# Patient Record
Sex: MALE | Race: WHITE | NOT HISPANIC OR LATINO | URBAN - METROPOLITAN AREA
[De-identification: names, ages, dates, MRNs, and addresses within clinical notes are randomized per-mention and may not be internally consistent; named-entity substitution may affect disease eponyms.]

---

## 2017-03-30 ENCOUNTER — COMMUNICATION - HEALTHEAST (OUTPATIENT)
Dept: INTERNAL MEDICINE | Facility: CLINIC | Age: 57
End: 2017-03-30

## 2017-04-20 ENCOUNTER — COMMUNICATION - HEALTHEAST (OUTPATIENT)
Dept: INTERNAL MEDICINE | Facility: CLINIC | Age: 57
End: 2017-04-20

## 2017-04-20 DIAGNOSIS — I42.8 OTHER PRIMARY CARDIOMYOPATHIES: ICD-10-CM

## 2017-06-06 ENCOUNTER — COMMUNICATION - HEALTHEAST (OUTPATIENT)
Dept: INTERNAL MEDICINE | Facility: CLINIC | Age: 57
End: 2017-06-06

## 2017-06-06 DIAGNOSIS — E78.5 HYPERLIPIDEMIA: ICD-10-CM

## 2017-08-08 ENCOUNTER — COMMUNICATION - HEALTHEAST (OUTPATIENT)
Dept: SCHEDULING | Facility: CLINIC | Age: 57
End: 2017-08-08

## 2017-08-09 ENCOUNTER — OFFICE VISIT - HEALTHEAST (OUTPATIENT)
Dept: INTERNAL MEDICINE | Facility: CLINIC | Age: 57
End: 2017-08-09

## 2017-08-09 ENCOUNTER — AMBULATORY - HEALTHEAST (OUTPATIENT)
Dept: INTERNAL MEDICINE | Facility: CLINIC | Age: 57
End: 2017-08-09

## 2017-08-09 DIAGNOSIS — I10 BENIGN ESSENTIAL HTN: ICD-10-CM

## 2017-08-09 DIAGNOSIS — R00.0 TACHYCARDIA: ICD-10-CM

## 2017-08-09 DIAGNOSIS — F41.1 GAD (GENERALIZED ANXIETY DISORDER): ICD-10-CM

## 2017-08-09 ASSESSMENT — MIFFLIN-ST. JEOR: SCORE: 2095.03

## 2017-08-18 ENCOUNTER — OFFICE VISIT - HEALTHEAST (OUTPATIENT)
Dept: INTERNAL MEDICINE | Facility: CLINIC | Age: 57
End: 2017-08-18

## 2017-08-18 DIAGNOSIS — I10 ESSENTIAL HYPERTENSION WITH GOAL BLOOD PRESSURE LESS THAN 140/90: ICD-10-CM

## 2017-08-18 ASSESSMENT — MIFFLIN-ST. JEOR: SCORE: 2081.43

## 2017-10-27 ENCOUNTER — COMMUNICATION - HEALTHEAST (OUTPATIENT)
Dept: INTERNAL MEDICINE | Facility: CLINIC | Age: 57
End: 2017-10-27

## 2017-10-27 DIAGNOSIS — F41.1 GAD (GENERALIZED ANXIETY DISORDER): ICD-10-CM

## 2017-11-03 ENCOUNTER — OFFICE VISIT - HEALTHEAST (OUTPATIENT)
Dept: INTERNAL MEDICINE | Facility: CLINIC | Age: 57
End: 2017-11-03

## 2017-11-03 DIAGNOSIS — I10 ESSENTIAL HYPERTENSION WITH GOAL BLOOD PRESSURE LESS THAN 140/90: ICD-10-CM

## 2017-11-03 DIAGNOSIS — F41.1 GAD (GENERALIZED ANXIETY DISORDER): ICD-10-CM

## 2017-11-03 ASSESSMENT — MIFFLIN-ST. JEOR: SCORE: 2090.5

## 2018-01-15 ENCOUNTER — COMMUNICATION - HEALTHEAST (OUTPATIENT)
Dept: INTERNAL MEDICINE | Facility: CLINIC | Age: 58
End: 2018-01-15

## 2018-04-08 ENCOUNTER — COMMUNICATION - HEALTHEAST (OUTPATIENT)
Dept: INTERNAL MEDICINE | Facility: CLINIC | Age: 58
End: 2018-04-08

## 2018-04-08 DIAGNOSIS — E78.5 HYPERLIPIDEMIA: ICD-10-CM

## 2018-07-26 ENCOUNTER — COMMUNICATION - HEALTHEAST (OUTPATIENT)
Dept: INTERNAL MEDICINE | Facility: CLINIC | Age: 58
End: 2018-07-26

## 2018-07-26 DIAGNOSIS — I10 ESSENTIAL HYPERTENSION WITH GOAL BLOOD PRESSURE LESS THAN 140/90: ICD-10-CM

## 2018-09-18 ENCOUNTER — COMMUNICATION - HEALTHEAST (OUTPATIENT)
Dept: INTERNAL MEDICINE | Facility: CLINIC | Age: 58
End: 2018-09-18

## 2018-09-18 DIAGNOSIS — I10 ESSENTIAL HYPERTENSION WITH GOAL BLOOD PRESSURE LESS THAN 140/90: ICD-10-CM

## 2018-10-07 ENCOUNTER — COMMUNICATION - HEALTHEAST (OUTPATIENT)
Dept: INTERNAL MEDICINE | Facility: CLINIC | Age: 58
End: 2018-10-07

## 2018-10-07 DIAGNOSIS — E78.5 HYPERLIPIDEMIA: ICD-10-CM

## 2018-10-14 ENCOUNTER — COMMUNICATION - HEALTHEAST (OUTPATIENT)
Dept: INTERNAL MEDICINE | Facility: CLINIC | Age: 58
End: 2018-10-14

## 2018-10-16 ENCOUNTER — COMMUNICATION - HEALTHEAST (OUTPATIENT)
Dept: INTERNAL MEDICINE | Facility: CLINIC | Age: 58
End: 2018-10-16

## 2018-10-16 DIAGNOSIS — F41.1 GAD (GENERALIZED ANXIETY DISORDER): ICD-10-CM

## 2018-11-19 ENCOUNTER — COMMUNICATION - HEALTHEAST (OUTPATIENT)
Dept: INTERNAL MEDICINE | Facility: CLINIC | Age: 58
End: 2018-11-19

## 2018-11-19 DIAGNOSIS — I10 ESSENTIAL HYPERTENSION WITH GOAL BLOOD PRESSURE LESS THAN 140/90: ICD-10-CM

## 2018-12-17 ENCOUNTER — COMMUNICATION - HEALTHEAST (OUTPATIENT)
Dept: INTERNAL MEDICINE | Facility: CLINIC | Age: 58
End: 2018-12-17

## 2018-12-17 DIAGNOSIS — E78.5 HYPERLIPIDEMIA: ICD-10-CM

## 2019-01-01 ENCOUNTER — ANESTHESIA - HEALTHEAST (OUTPATIENT)
Dept: CT IMAGING | Facility: CLINIC | Age: 59
End: 2019-01-01

## 2019-01-01 ENCOUNTER — COMMUNICATION - HEALTHEAST (OUTPATIENT)
Dept: INTERNAL MEDICINE | Facility: CLINIC | Age: 59
End: 2019-01-01

## 2019-01-01 DIAGNOSIS — I10 ESSENTIAL HYPERTENSION WITH GOAL BLOOD PRESSURE LESS THAN 140/90: ICD-10-CM

## 2019-01-01 DIAGNOSIS — F41.1 GAD (GENERALIZED ANXIETY DISORDER): ICD-10-CM

## 2019-01-01 DIAGNOSIS — E78.5 HYPERLIPIDEMIA: ICD-10-CM

## 2019-01-01 RX ORDER — FUROSEMIDE 40 MG
TABLET ORAL
Qty: 90 TABLET | Refills: 3 | Status: SHIPPED | OUTPATIENT
Start: 2019-01-01

## 2019-01-01 RX ORDER — METOPROLOL TARTRATE 50 MG
TABLET ORAL
Qty: 180 TABLET | Refills: 3 | Status: SHIPPED | OUTPATIENT
Start: 2019-01-01

## 2019-01-01 RX ORDER — ROSUVASTATIN CALCIUM 40 MG/1
TABLET, COATED ORAL
Qty: 90 TABLET | Refills: 3 | Status: SHIPPED | OUTPATIENT
Start: 2019-01-01

## 2019-01-01 RX ORDER — LISINOPRIL 40 MG/1
TABLET ORAL
Qty: 90 TABLET | Refills: 4 | Status: SHIPPED | OUTPATIENT
Start: 2019-01-01

## 2019-01-01 RX ORDER — CITALOPRAM HYDROBROMIDE 20 MG/1
TABLET ORAL
Qty: 30 TABLET | Refills: 10 | Status: SHIPPED | OUTPATIENT
Start: 2019-01-01

## 2021-05-27 NOTE — TELEPHONE ENCOUNTER
RN cannot approve Refill Request    RN can NOT refill this medication PCP messaged that patient is overdue for Office Visit.       Jessica Brown, [facility] Triage/Med Refill 4/15/2019    Requested Prescriptions   Pending Prescriptions Disp Refills     rosuvastatin (CRESTOR) 40 MG tablet [Pharmacy Med Name: ROSUVASTATIN TABS 40MG] 90 tablet 0     Sig: TAKE 1 TABLET DAILY (DOSE INCREASE PER DR OQUENDO)       Statins Refill Protocol (Hmg CoA Reductase Inhibitors) Failed - 4/13/2019 12:05 AM        Failed - PCP or prescribing provider visit in past 12 months      Last office visit with prescriber/PCP: 11/3/2017 Donnie Rodrigez MD OR same dept: Visit date not found OR same specialty: 11/3/2017 Donnie Rodrigez MD  Last physical: 4/6/2016 Last MTM visit: Visit date not found   Next visit within 3 mo: Visit date not found  Next physical within 3 mo: Visit date not found  Prescriber OR PCP: Donnie Rodrigez MD  Last diagnosis associated with med order: 1. Hyperlipidemia  - rosuvastatin (CRESTOR) 40 MG tablet [Pharmacy Med Name: ROSUVASTATIN TABS 40MG]; TAKE 1 TABLET DAILY (DOSE INCREASE PER DR OQUENDO)  Dispense: 90 tablet; Refill: 0    If protocol passes may refill for 12 months if within 3 months of last provider visit (or a total of 15 months).

## 2021-05-27 NOTE — TELEPHONE ENCOUNTER
RN cannot approve Refill Request    RN can NOT refill this medication PCP messaged that patient is overdue for Office Visit       Jessica Brown, [facility] Triage/Med Refill 4/4/2019    Requested Prescriptions   Pending Prescriptions Disp Refills     metoprolol tartrate (LOPRESSOR) 50 MG tablet [Pharmacy Med Name: METOPROLOL TARTRATE 50 MG TAB] 180 tablet 3     Sig: TAKE 1 TABLET BY MOUTH TWICE A DAY DUE FOR FOLLOW UP APPT    Beta-Blockers Refill Protocol Failed - 4/3/2019  1:30 AM       Failed - PCP or prescribing provider visit in past 12 months or next 3 months    Last office visit with prescriber/PCP: 11/3/2017 Donnie Rodrigez MD OR same dept: Visit date not found OR same specialty: 11/3/2017 Donnie Rodrigez MD  Last physical: 4/6/2016 Last MTM visit: Visit date not found   Next visit within 3 mo: Visit date not found  Next physical within 3 mo: Visit date not found  Prescriber OR PCP: Donnie Rodrigez MD  Last diagnosis associated with med order: 1. Essential hypertension with goal blood pressure less than 140/90  - metoprolol tartrate (LOPRESSOR) 50 MG tablet [Pharmacy Med Name: METOPROLOL TARTRATE 50 MG TAB]; TAKE 1 TABLET BY MOUTH TWICE A DAY DUE FOR FOLLOW UP APPT  Dispense: 60 tablet; Refill: 1    If protocol passes may refill for 12 months if within 3 months of last provider visit (or a total of 15 months).            Failed - Blood pressure filed in past 12 months    BP Readings from Last 1 Encounters:   11/03/17 (!) 168/101

## 2021-05-31 VITALS — HEIGHT: 68 IN | WEIGHT: 291 LBS | BODY MASS INDEX: 44.1 KG/M2

## 2021-05-31 VITALS — WEIGHT: 292 LBS | HEIGHT: 68 IN | BODY MASS INDEX: 44.25 KG/M2

## 2021-05-31 VITALS — BODY MASS INDEX: 43.8 KG/M2 | WEIGHT: 289 LBS | HEIGHT: 68 IN

## 2021-06-01 NOTE — TELEPHONE ENCOUNTER
RN cannot approve Refill Request    RN can NOT refill this medication Protocol failed and NO refill given. Last office visit: 11/3/2017 Donnie Rodrigez MD Last Physical: 4/6/2016 Last MTM visit: Visit date not found Last visit same specialty: 11/3/2017 Donnie Rodrigez MD.  Next visit within 3 mo: Visit date not found  Next physical within 3 mo: Visit date not found      Suyapa Roca, [facility] Triage/Med Refill 9/28/2019    Requested Prescriptions   Pending Prescriptions Disp Refills     citalopram (CELEXA) 20 MG tablet [Pharmacy Med Name: CITALOPRAM HBR 20 MG TABLET] 30 tablet 10     Sig: TAKE 1 TABLET (20 MG TOTAL) BY MOUTH DAILY.       [facility] Refill Protocol  Failed - 9/28/2019 12:17 AM        Failed - PCP or prescribing provider visit in last year     Last office visit with prescriber/PCP: 11/3/2017 Donnie Rodrigez MD OR same dept: Visit date not found OR same specialty: 11/3/2017 Donnie Rodrigez MD  Last physical: 4/6/2016 Last MTM visit: Visit date not found   Next visit within 3 mo: Visit date not found  Next physical within 3 mo: Visit date not found  Prescriber OR PCP: Donnie Rodrigez MD  Last diagnosis associated with med order: 1. EVE (generalized anxiety disorder)  - citalopram (CELEXA) 20 MG tablet [Pharmacy Med Name: CITALOPRAM HBR 20 MG TABLET]; TAKE 1 TABLET (20 MG TOTAL) BY MOUTH DAILY.  Dispense: 30 tablet; Refill: 10    If protocol passes may refill for 12 months if within 3 months of last provider visit (or a total of 15 months).

## 2021-06-02 ENCOUNTER — RECORDS - HEALTHEAST (OUTPATIENT)
Dept: ADMINISTRATIVE | Facility: CLINIC | Age: 61
End: 2021-06-02

## 2021-06-02 NOTE — TELEPHONE ENCOUNTER
RN cannot approve Refill Request    RN can NOT refill this medication PCP messaged that patient is overdue for Labs and Office Visit. Last office visit: 11/3/2017 Donnie Rodrigez MD Last Physical: 4/6/2016 Last MTM visit: Visit date not found Last visit same specialty: 11/3/2017 Donnie Rodrigez MD.  Next visit within 3 mo: Visit date not found  Next physical within 3 mo: Visit date not found      Javon Kumar, [facility] Triage/Med Refill 10/14/2019    Requested Prescriptions   Pending Prescriptions Disp Refills     lisinopril (PRINIVIL,ZESTRIL) 40 MG tablet [Pharmacy Med Name: LISINOPRIL TABS 40MG] 90 tablet 4     Sig: TAKE 1 TABLET DAILY       Ace Inhibitors Refill Protocol Failed - 10/13/2019  6:25 AM        Failed - PCP or prescribing provider visit in past 12 months       Last office visit with prescriber/PCP: 11/3/2017 Donnie Rodrigez MD OR same dept: Visit date not found OR same specialty: 11/3/2017 Donnie Rodrigez MD  Last physical: 4/6/2016 Last MTM visit: Visit date not found   Next visit within 3 mo: Visit date not found  Next physical within 3 mo: Visit date not found  Prescriber OR PCP: Donnie Rodrigez MD  Last diagnosis associated with med order: There are no diagnoses linked to this encounter.  If protocol passes may refill for 12 months if within 3 months of last provider visit (or a total of 15 months).             Failed - Serum Potassium in past 12 months     No results found for: LN-POTASSIUM          Failed - Blood pressure filed in past 12 months     BP Readings from Last 1 Encounters:   11/03/17 (!) 168/101             Failed - Serum Creatinine in past 12 months     Creatinine   Date Value Ref Range Status   08/09/2017 0.87 0.70 - 1.30 mg/dL Final

## 2021-06-12 NOTE — PROGRESS NOTES
Office Visit - Follow Up   Ramu Melendez   57 y.o. male    Date of Visit: 8/9/2017    Chief Complaint   Patient presents with     Hypertension     BP has been running high at dental appointment and at home, took extra Lisinopril yesterday        Assessment and Plan   1. Benign essential HTN   Currently under poor control with blood pressure of 176/116. Pulse is also fast. Heart rate is 107. Will increases lisinopril to 10 mg two tablets per day. Will start metoprolol at 50 mg BID. He will see me in office follow-up in about one week and see if things have improved.  - HM2(CBC w/o Differential)  - Comprehensive Metabolic Panel    2. Tachycardia   No syncope no lightheadedness. We should check his thyroid function studies all the of these are likely normal. Beta-blocker should help.  - Thyroid Stimulating Hormone (TSH)  - T4, Free    3. EVE (generalized anxiety disorder)   He's feeling ongoing trouble with anxiety. No significant depression. Will start him on Celexa 10 mg per day and adjust dose as needed in one week when I see him in follow-up.      The following high BMI interventions were performed this visit: encouragement to exercise and weight monitoring    No Follow-up on file.     History of Present Illness   This 57 y.o. old  Comes in reporting his blood pressures been running high. He called in to the nurse line yesterday and was told to take an additional dose of his lisinopril. He denies any chest pain. He admits to feeling increase amount of anxiety. He's having some stress  At work as an . He's not having any success with his weight loss regimen. He denies feeling depressed but does worry about his health and his job. He has a previous history of dilated cardiomyopathy that has resolved. He denies any orthopnea nor PND.    Review of Systems: A comprehensive review of systems was negative except as noted.     Medications, Allergies and Problem List   Reviewed and updated     Physical Exam  "  General Appearance:       BP (!) 170/114 (Patient Site: Left Arm, Patient Position: Sitting)  Pulse (!) 107  Ht 5' 7.75\" (1.721 m)  Wt (!) 292 lb (132.5 kg)  SpO2 96%  BMI 44.73 kg/m2     Repeat blood pressure by me is 176/116 with the pulse of 106. Rhythm is regular. No murmurs are heard. Lungs are clear. No edema. His mood is good. He does not appear anxious. He is not depressed.     Additional Information   Current Outpatient Prescriptions   Medication Sig Dispense Refill     aspirin 325 MG tablet Take 325 mg by mouth daily.       LASIX 40 mg tablet TAKE 1 TABLET DAILY 90 tablet 2     lisinopril (PRINIVIL,ZESTRIL) 10 MG tablet TAKE 1 TABLET DAILY 90 tablet 2     loratadine (CLARITIN) 10 mg tablet        omega-3 fatty acids-vitamin E (FISH OIL) 1,000 mg cap 2 capsules daily.       rosuvastatin (CRESTOR) 40 MG tablet TAKE 1 TABLET DAILY (DOSE INCREASE PER DR OQUENDO) 90 tablet 2     citalopram (CELEXA) 10 MG tablet Take 1 tablet (10 mg total) by mouth daily. 30 tablet 2     metoprolol tartrate (LOPRESSOR) 50 MG tablet Take 1 tablet (50 mg total) by mouth 2 (two) times a day. 60 tablet 11     No current facility-administered medications for this visit.      No Known Allergies  Social History   Substance Use Topics     Smoking status: Former Smoker     Smokeless tobacco: None     Alcohol use No       Review and/or order of clinical lab tests:  Review and/or order of radiology tests:  Review and/or order of medicine tests:  Discussion of test results with performing physician:  Decision to obtain old records and/or obtain history from someone other than the patient:  Review and summarization of old records and/or obtaining history from someone other than the patient and.or discussion of case with another health care provider:  Independent visualization of image, tracing or specimen itself:    Time: total time spent with the patient was 25 minutes of which >50% was spent in counseling and coordination of care "     Donnie Rodrigez MD

## 2021-06-12 NOTE — PROGRESS NOTES
"  Office Visit - Follow Up   Ramu Melendez   57 y.o. male    Date of Visit: 8/18/2017    Chief Complaint   Patient presents with     Hypertension     BP check - increased meds at last visit (Lisinopril 10mg BID & started Metoprolol 50mg BID)        Assessment and Plan   1. Essential hypertension with goal blood pressure less than 140/90   Blood pressure excellent now at 139/86. He is okay for planned dental procedure. He'll return to clinic to see me in two months. At that time will reassess his blood pressure and consider laboratory studies. He's lost a bit of weight in the last week due to inability to eat normally. He hopes to continue to lose weight.          No Follow-up on file.     History of Present Illness   This 57 y.o. old  I saw him one week ago. He couldn't get a tooth extraction done as his blood pressure was up to 180 systolic as verified here in our office . Keys now but on 20 mg ofLisinopril per day and also metoprolol 50 mg BID.  He's tolerating this regimen well. He feels well. His tooth is not hurting as much at all of late but he like to get it fixed.    Review of Systems: A comprehensive review of systems was negative except as noted.     Medications, Allergies and Problem List   Reviewed and updated     Physical Exam   General Appearance:       /86 (Patient Site: Right Arm, Patient Position: Sitting)  Pulse 70  Ht 5' 7.75\" (1.721 m)  Wt (!) 289 lb (131.1 kg)  SpO2 96%  BMI 44.27 kg/m2     Weight is down 3 pounds. Blood pressure excellent as above. Heart rhythm regular. O2 sats are good. Lungs are clear. Okay for planned dental surgery.     Additional Information   Current Outpatient Prescriptions   Medication Sig Dispense Refill     aspirin 325 MG tablet Take 325 mg by mouth daily.       citalopram (CELEXA) 10 MG tablet Take 1 tablet (10 mg total) by mouth daily. 30 tablet 2     LASIX 40 mg tablet TAKE 1 TABLET DAILY 90 tablet 2     lisinopril (PRINIVIL,ZESTRIL) 10 MG tablet TAKE 1 " TABLET DAILY 90 tablet 2     loratadine (CLARITIN) 10 mg tablet        metoprolol tartrate (LOPRESSOR) 50 MG tablet Take 1 tablet (50 mg total) by mouth 2 (two) times a day. 60 tablet 11     omega-3 fatty acids-vitamin E (FISH OIL) 1,000 mg cap 2 capsules daily.       rosuvastatin (CRESTOR) 40 MG tablet TAKE 1 TABLET DAILY (DOSE INCREASE PER DR OQUENDO) 90 tablet 2     No current facility-administered medications for this visit.      No Known Allergies  Social History   Substance Use Topics     Smoking status: Former Smoker     Smokeless tobacco: None     Alcohol use No       Review and/or order of clinical lab tests:  Review and/or order of radiology tests:  Review and/or order of medicine tests:  Discussion of test results with performing physician:  Decision to obtain old records and/or obtain history from someone other than the patient:  Review and summarization of old records and/or obtaining history from someone other than the patient and.or discussion of case with another health care provider:  Independent visualization of image, tracing or specimen itself:    Time: total time spent with the patient was 15 minutes of which >50% was spent in counseling and coordination of care     Donnie Rodrigez MD

## 2021-06-13 NOTE — PROGRESS NOTES
"  Office Visit - Follow Up   Ramu Melendez   57 y.o. male    Date of Visit: 11/3/2017    Chief Complaint   Patient presents with     Hypertension     BP check - Feels he needs Citalopram increased        Assessment and Plan   1. EVE (generalized anxiety disorder)   Overall he feels more anxious of late. Denies any significant depressive symptoms.  Will increase the citalopram to 20 mg per day.  - citalopram (CELEXA) 20 MG tablet; Take 1 tablet (20 mg total) by mouth daily.  Dispense: 30 tablet; Refill: 11    2. Essential hypertension with goal blood pressure less than 140/90   Blood pressure remains inadequate today on repeat I get 174/102. Will increase  His lisinopril to 40 mg per day. New prescription was written. He'll see me in follow-up in two months to recheck blood pressure. He'll check some outside blood pressure readings.      The following high BMI interventions were performed this visit: encouragement to exercise and weight monitoring    No Follow-up on file.     History of Present Illness   This 57 y.o. old  Comes in for follow-up on his high blood pressure. Last visit we increased his lisinopril to 20 mg per day. He tolerates this well without a cough. He's been monitoring his blood pressure at home a bit and he gets readings in the 150s generally. He also reports that he's feeling more anxious of late. When he initially started taking the citalopram it worked very well for his anxiety now it seems to be returning some.    Review of Systems: A comprehensive review of systems was negative except as noted.     Medications, Allergies and Problem List   Reviewed and updated     Physical Exam   General Appearance:       BP (!) 168/101 (Patient Site: Left Arm, Patient Position: Sitting, Cuff Size: Adult Large)  Pulse 67  Ht 5' 7.75\" (1.721 m)  Wt (!) 291 lb (132 kg)  SpO2 95%  BMI 44.57 kg/m2     His weight is up 2 pounds. He needs to get more serious about his weight loss. We discussed this. Repeat " blood pressure is 174/102. Heart rhythm is regular without murmurs. Lungs are clear. His mood seems positive. No edema.     Additional Information   Current Outpatient Prescriptions   Medication Sig Dispense Refill     aspirin 325 MG tablet Take 325 mg by mouth daily.       citalopram (CELEXA) 20 MG tablet Take 1 tablet (20 mg total) by mouth daily. 30 tablet 11     LASIX 40 mg tablet TAKE 1 TABLET DAILY 90 tablet 2     lisinopril (PRINIVIL,ZESTRIL) 40 MG tablet Take 1 tablet (40 mg total) by mouth daily. 90 tablet 3     loratadine (CLARITIN) 10 mg tablet        metoprolol tartrate (LOPRESSOR) 50 MG tablet Take 1 tablet (50 mg total) by mouth 2 (two) times a day. 60 tablet 11     omega-3 fatty acids-vitamin E (FISH OIL) 1,000 mg cap 2 capsules daily.       rosuvastatin (CRESTOR) 40 MG tablet TAKE 1 TABLET DAILY (DOSE INCREASE PER DR OQUENDO) 90 tablet 2     No current facility-administered medications for this visit.      No Known Allergies  Social History   Substance Use Topics     Smoking status: Former Smoker     Smokeless tobacco: None     Alcohol use No       Review and/or order of clinical lab tests:  Review and/or order of radiology tests:  Review and/or order of medicine tests:  Discussion of test results with performing physician:  Decision to obtain old records and/or obtain history from someone other than the patient:  Review and summarization of old records and/or obtaining history from someone other than the patient and.or discussion of case with another health care provider:  Independent visualization of image, tracing or specimen itself:    Time: total time spent with the patient was 15 minutes of which >50% was spent in counseling and coordination of care     Donnie Rodrigez MD

## 2021-06-16 PROBLEM — F41.1 GAD (GENERALIZED ANXIETY DISORDER): Status: ACTIVE | Noted: 2017-08-14

## 2021-06-16 PROBLEM — I10 ESSENTIAL HYPERTENSION WITH GOAL BLOOD PRESSURE LESS THAN 140/90: Status: ACTIVE | Noted: 2017-08-20

## 2021-06-23 NOTE — TELEPHONE ENCOUNTER
RN cannot approve Refill Request    RN can NOT refill this medication PCP messaged that patient is overdue for Labs and Office Visit. Last office visit: 11/3/2017 Donnie Rodrigez MD Last Physical: 4/6/2016 Last MTM visit: Visit date not found Last visit same specialty: 11/3/2017 Donnie Rodrigez MD.  Next visit within 3 mo: Visit date not found  Next physical within 3 mo: Visit date not found      Jigna BAI Rehana, [facility] Triage/Med Refill 1/17/2019    Requested Prescriptions   Pending Prescriptions Disp Refills     metoprolol tartrate (LOPRESSOR) 50 MG tablet [Pharmacy Med Name: METOPROLOL TARTRATE 50 MG TAB] 60 tablet 1     Sig: TAKE 1 TABLET BY MOUTH TWICE A DAY DUE FOR FOLLOW UP APPT    Beta-Blockers Refill Protocol Failed - 1/17/2019  4:05 AM       Failed - PCP or prescribing provider visit in past 12 months or next 3 months    Last office visit with prescriber/PCP: 11/3/2017 Donnie Rodrigez MD OR same dept: Visit date not found OR same specialty: 11/3/2017 Donnie Rodrigez MD  Last physical: 4/6/2016 Last MTM visit: Visit date not found   Next visit within 3 mo: Visit date not found  Next physical within 3 mo: Visit date not found  Prescriber OR PCP: Donnie Rodrigez MD  Last diagnosis associated with med order: 1. Essential hypertension with goal blood pressure less than 140/90  - metoprolol tartrate (LOPRESSOR) 50 MG tablet [Pharmacy Med Name: METOPROLOL TARTRATE 50 MG TAB]; TAKE 1 TABLET BY MOUTH TWICE A DAY DUE FOR FOLLOW UP APPT  Dispense: 60 tablet; Refill: 1    If protocol passes may refill for 12 months if within 3 months of last provider visit (or a total of 15 months).            Failed - Blood pressure filed in past 12 months    BP Readings from Last 1 Encounters:   11/03/17 (!) 168/101

## 2021-06-23 NOTE — TELEPHONE ENCOUNTER
RN cannot approve Refill Request    RN can NOT refill this medication PCP messaged that patient is overdue for Labs and Office Visit.     Jessica Brown, [facility] Triage/Med Refill 1/14/2019    Requested Prescriptions   Pending Prescriptions Disp Refills     furosemide (LASIX) 40 MG tablet [Pharmacy Med Name: FUROSEMIDE TABS 40MG] 90 tablet 3     Sig: TAKE 1 TABLET DAILY    Diuretics/Combination Diuretics Refill Protocol  Failed - 1/13/2019 11:33 PM       Failed - Visit with PCP or prescribing provider visit in past 12 months    Last office visit with prescriber/PCP: 11/3/2017 Donnie Rodrigez MD OR same dept: Visit date not found OR same specialty: 11/3/2017 Donnie Rodrigez MD  Last physical: 4/6/2016 Last MTM visit: Visit date not found   Next visit within 3 mo: Visit date not found  Next physical within 3 mo: Visit date not found  Prescriber OR PCP: Donnie Rodrigez MD  Last diagnosis associated with med order: There are no diagnoses linked to this encounter.  If protocol passes may refill for 12 months if within 3 months of last provider visit (or a total of 15 months).            Failed - Serum Potassium in past 12 months     No results found for: LN-POTASSIUM         Failed - Serum Sodium in past 12 months     No results found for: LN-SODIUM         Failed - Blood pressure on file in past 12 months    BP Readings from Last 1 Encounters:   11/03/17 (!) 168/101            Failed - Serum Creatinine in past 12 months     Creatinine   Date Value Ref Range Status   08/09/2017 0.87 0.70 - 1.30 mg/dL Final